# Patient Record
Sex: FEMALE | Race: WHITE | Employment: UNEMPLOYED | URBAN - METROPOLITAN AREA
[De-identification: names, ages, dates, MRNs, and addresses within clinical notes are randomized per-mention and may not be internally consistent; named-entity substitution may affect disease eponyms.]

---

## 2018-06-21 ENCOUNTER — COMMUNICATION - HEALTHEAST (OUTPATIENT)
Dept: PHYSICAL MEDICINE AND REHAB | Facility: CLINIC | Age: 48
End: 2018-06-21

## 2020-12-03 ENCOUNTER — OFFICE VISIT (OUTPATIENT)
Dept: NEUROLOGY | Facility: CLINIC | Age: 50
End: 2020-12-03
Payer: COMMERCIAL

## 2020-12-03 VITALS
WEIGHT: 178.6 LBS | DIASTOLIC BLOOD PRESSURE: 107 MMHG | BODY MASS INDEX: 30.49 KG/M2 | HEIGHT: 64 IN | SYSTOLIC BLOOD PRESSURE: 163 MMHG | HEART RATE: 78 BPM

## 2020-12-03 DIAGNOSIS — R56.9 SEIZURE-LIKE ACTIVITY (H): Primary | ICD-10-CM

## 2020-12-03 PROBLEM — M47.816 OSTEOARTHRITIS OF LUMBAR SPINE: Status: ACTIVE | Noted: 2018-06-19

## 2020-12-03 PROBLEM — G43.909 MIGRAINE HEADACHE: Status: ACTIVE | Noted: 2020-12-03

## 2020-12-03 PROBLEM — E55.9 VITAMIN D DEFICIENCY: Status: ACTIVE | Noted: 2018-11-21

## 2020-12-03 PROBLEM — G89.29 CHRONIC BACK PAIN: Status: ACTIVE | Noted: 2020-12-03

## 2020-12-03 PROBLEM — F90.9 ATTENTION DEFICIT HYPERACTIVITY DISORDER (ADHD): Status: ACTIVE | Noted: 2020-12-03

## 2020-12-03 PROBLEM — M54.9 CHRONIC BACK PAIN: Status: ACTIVE | Noted: 2020-12-03

## 2020-12-03 PROBLEM — F41.1 GENERALIZED ANXIETY DISORDER: Status: ACTIVE | Noted: 2019-03-06

## 2020-12-03 PROCEDURE — 99205 OFFICE O/P NEW HI 60 MIN: CPT | Performed by: PSYCHIATRY & NEUROLOGY

## 2020-12-03 RX ORDER — BUPROPION HYDROCHLORIDE 200 MG/1
200 TABLET, EXTENDED RELEASE ORAL
COMMUNITY
Start: 2019-02-26

## 2020-12-03 RX ORDER — LEVOTHYROXINE SODIUM 88 UG/1
TABLET ORAL
COMMUNITY
Start: 2020-11-18

## 2020-12-03 RX ORDER — TRAZODONE HYDROCHLORIDE 100 MG/1
TABLET ORAL
COMMUNITY
Start: 2020-11-12

## 2020-12-03 RX ORDER — OFLOXACIN 3 MG/ML
SOLUTION AURICULAR (OTIC)
COMMUNITY
Start: 2020-11-17

## 2020-12-03 RX ORDER — PROPRANOLOL HYDROCHLORIDE 120 MG/1
CAPSULE, EXTENDED RELEASE ORAL
COMMUNITY
Start: 2020-10-19

## 2020-12-03 RX ORDER — OLANZAPINE 20 MG/1
TABLET ORAL
COMMUNITY
Start: 2020-11-12

## 2020-12-03 RX ORDER — ALPRAZOLAM 2 MG
TABLET ORAL
COMMUNITY
Start: 2020-11-12

## 2020-12-03 RX ORDER — BUSPIRONE HYDROCHLORIDE 30 MG/1
TABLET ORAL
COMMUNITY
Start: 2020-11-18

## 2020-12-03 RX ORDER — DEXTROAMPHETAMINE SACCHARATE, AMPHETAMINE ASPARTATE, DEXTROAMPHETAMINE SULFATE AND AMPHETAMINE SULFATE 7.5; 7.5; 7.5; 7.5 MG/1; MG/1; MG/1; MG/1
TABLET ORAL
COMMUNITY
Start: 2020-11-16

## 2020-12-03 RX ORDER — GABAPENTIN 300 MG/1
300 CAPSULE ORAL 3 TIMES DAILY
COMMUNITY
Start: 2020-11-16

## 2020-12-03 RX ORDER — FLUOXETINE 40 MG/1
CAPSULE ORAL
COMMUNITY
Start: 2020-11-16

## 2020-12-03 RX ORDER — DEXTROAMPHETAMINE SACCHARATE, AMPHETAMINE ASPARTATE, DEXTROAMPHETAMINE SULFATE AND AMPHETAMINE SULFATE 3.75; 3.75; 3.75; 3.75 MG/1; MG/1; MG/1; MG/1
TABLET ORAL
COMMUNITY
Start: 2020-11-16

## 2020-12-03 SDOH — HEALTH STABILITY: MENTAL HEALTH: HOW OFTEN DO YOU HAVE A DRINK CONTAINING ALCOHOL?: NOT ASKED

## 2020-12-03 SDOH — HEALTH STABILITY: MENTAL HEALTH: HOW MANY STANDARD DRINKS CONTAINING ALCOHOL DO YOU HAVE ON A TYPICAL DAY?: NOT ASKED

## 2020-12-03 SDOH — HEALTH STABILITY: MENTAL HEALTH: HOW OFTEN DO YOU HAVE 6 OR MORE DRINKS ON ONE OCCASION?: NOT ASKED

## 2020-12-03 ASSESSMENT — MIFFLIN-ST. JEOR: SCORE: 1415.12

## 2020-12-03 NOTE — LETTER
12/3/2020         RE: Xochitl Taveras  [address] [place] [address]        Dear Colleague,    Thank you for referring your patient, Xochitl Taveras, to the [facility] [place]. Please see a copy of my visit note below.    NEUROLOGY CONSULTATION NOTE       [facility] [place]  [address] [place] [address]  Tel: [phone]  Fax: [phone]  [email or web address]     Xochitl Taveras,  1970, MRN [id number]  PCP: Ángela Staley, [phone]  Date: 12/3/2020     ASSESSMENT & PLAN     Diagnosis code: Seizure-like activity     Seizure-like activity  50-year-old female with history of depression, anxiety, ADD, substance abuse who had 2 syncopal episode and during the second 1 had generalized shaking.  Although she has history of substance abuse she denies using any drugs at that time.  Work-up so far includes normal MRI and CT scan.  I have recommended:    1.  EEG  2.  Check vitamin B1, folate and B12  3.  Follow-up the day EEG schedule    Thank you again for this referral, please feel free to contact me if you have any questions.    Kalin Saldana MD  [facility]  (Formerly, [facility]     REASON FOR CONSULTATION Syncope        HISTORY OF PRESENT ILLNESS     We have been requested by Dr. Staley to evaluate Xochitl Taveras who is a 50 year old  female for altered mental status    Patient is a 50-year-old female with history of depression, anxiety, ADD, substance abuse who was referred from emergency room with complaint of dizziness and head injury.  According to family member patient had 2 consecutive unwitnessed fall prior to her being seen in the emergency room on 2020.  She was able to recall her first fall that occurred in the basement of their home and she was able to ambulate and recall the events leading up to the fall.  She had a second fall in the  kitchen when her mother found her and that she was having generalized twitches and unable to answer question.  This lasted for few minutes and afterward patient regained consciousness.  2 days after that second fall she remained confused that got mom concerned and she was seen in the emergency room.  She had a CT of the head and MRI that was unremarkable.  Lab work included normal CBC also urinalysis was normal      PROBLEM LIST   Patient Active Problem List   Diagnosis Code     Impetigo L01.00     ADD (attention deficit disorder) F98.8     Dysthymia F34.1     Generalized anxiety disorder F41.1     Hypothyroidism E03.9     Migraine headache G43.909     Vitamin D deficiency E55.9     Chronic back pain M54.9, G89.29     Osteoarthritis of lumbar spine M47.816     Substance abuse (H) F19.10         PAST MEDICAL & SURGICAL HISTORY     Past Medical History:   Patient  has no past medical history on file.    Surgical History:  She  has a past surgical history that includes biopsy of skin lesion.     SOCIAL HISTORY     Reviewed, and she  reports that she has been smoking cigarettes. She has been smoking about 0.25 packs per day. She has never used smokeless tobacco. She reports previous alcohol use.     FAMILY HISTORY     Reviewed, and family history includes Alcoholism in her father and sister; Alzheimer Disease in her maternal grandmother; Congenital heart disease in her mother; Hypertension in her mother; Mental Illness in her maternal grandmother.     ALLERGIES     No Known Allergies      REVIEW OF SYSTEMS     A 12 point review of system was performed and was negative except as outlined in the history of present illness.     HOME MEDICATIONS       Current Outpatient Medications:      ALPRAZolam (XANAX) 2 MG tablet, , Disp: , Rfl:      amphetamine-dextroamphetamine (ADDERALL) 15 MG tablet, , Disp: , Rfl:      amphetamine-dextroamphetamine (ADDERALL) 30 MG tablet, , Disp: , Rfl:      buPROPion (WELLBUTRIN SR) 200 MG 12 hr  "tablet, Take 200 mg by mouth, Disp: , Rfl:      busPIRone HCl (BUSPAR) 30 MG tablet, , Disp: , Rfl:      cholecalciferol 50 MCG (2000 UT) CAPS, Take 2,000 Units by mouth, Disp: , Rfl:      Cyanocobalamin (VITAMIN B-12 PO), Take 1 tablet by mouth, Disp: , Rfl:      FLUoxetine (PROZAC) 40 MG capsule, , Disp: , Rfl:      gabapentin (NEURONTIN) 300 MG capsule, Take 300 mg by mouth 3 times daily, Disp: , Rfl:      levothyroxine (SYNTHROID/LEVOTHROID) 88 MCG tablet, TK 1 T PO QD, Disp: , Rfl:      ofloxacin (FLOXIN) 0.3 % otic solution, APPLY 10 DROPS TO LEFT EAR BID FOR 10 DAYS, Disp: , Rfl:      OLANZapine (ZYPREXA) 20 MG tablet, , Disp: , Rfl:      propranolol ER (INDERAL LA) 120 MG 24 hr capsule, , Disp: , Rfl:      tiZANidine (ZANAFLEX) 4 MG tablet, , Disp: , Rfl:      traZODone (DESYREL) 100 MG tablet, , Disp: , Rfl:      TURMERIC PO, , Disp: , Rfl:       PHYSICAL EXAM     Vital signs  BP (!) 163/107 (BP Location: Right arm, Patient Position: Sitting)   Pulse 78   Ht 1.626 m (5' 4\")   Wt 81 kg (178 lb 9.6 oz)   BMI 30.66 kg/m      Weight:   178 lbs 9.6 oz    GENERAL PHYSICAL EXAM: Patient is alert and oriented x 4 in no acute distress. Neck was supple, no carotid bruits, thyromegaly, lymphadenopathy or JVD noted.   NEUROLOGICAL EXAM:  Mental Status  Patient is A&O x 4. Patient recalls 3/3 objects at 5 minutes.  Speech  Speech is clear and fluent with good repetition, comprehension, and naming both for objects and parts of an object. Written and verbal comprehension is intact.  Cranial Nerves  CN II: Visual fields are full to confrontation. Fundoscopic exam is normal with sharp discs and no vascular changes. Venous pulsations are present bilaterally. Pupils are equal and reactive to light.   CN III, IV, VI: EOMI, PERRLA  CN V: Facial sensation is intact to pinprick in all 3 divisions bilaterally. Corneal responses are intact.  CN VII: Face is symmetric with normal eye closure and smile.  CN VII: Hearing is normal " to rubbing fingers  CN IX, X: Palate elevates symmetrically. Phonation is normal.  CN XI: Head turning and shoulder shrug are intact  CN XII: Tongue is midline with normal movements and no atrophy.  Motor Exam  Muscle bulk and tone are normal. No pronator drift. Strength is 5/5 bilaterally. No fasciculations noted.  Reflexes  Reflexes are 2+ and symmetric at the biceps, triceps, knees, and ankles. Plantar responses are flexor.  Sensory Exam  Light touch, pinprick, position sense, and vibration sense are intact bilaterally. No astereognosia, agraphesthesia or extinction to bilateral simultaneous stimulation.  Coordination  Rapid alternating movements and fine finger movements are intact. No dysmetria on FNF and HKS. Romberg negative.  Gait  Posture is normal.Tandem gait is normal. Able to walk on toes and heels.     DIAGNOSTIC STUDIES     PERTINENT RADIOLOGY  Following imaging studies were reviewed:     CT BRAIN 11/5/2020  1.  No acute intracranial injury, hemorrhage, mass, or CT evidence of recent ischemia.    MRI BRAIN 11/5/2020  1.  No evidence of acute intracranial hemorrhage, mass effect, or infarction.  2.  Minimal nonspecific white matter changes.     PERTINENT LABS  Following labs were reviewed:     Ref. Range 11/5/2020 14:20 11/5/2020 15:26   Potassium Latest Ref Range: 3.5 - 5.0 mmol/L  4.1   Chloride Latest Ref Range: 98 - 107 mmol/L  104   CO2 Latest Ref Range: 22 - 31 mmol/L  25   Anion Gap, Calculation Latest Ref Range: 5 - 18 mmol/L  9   BUN Latest Ref Range: 8 - 22 mg/dL  9   Creatinine Latest Ref Range: 0.60 - 1.10 mg/dL  0.71   GFR MDRD Af Amer Latest Ref Range: >60 mL/min/1.73m2  >60   GFR MDRD Non Af Amer Latest Ref Range: >60 mL/min/1.73m2  >60   Calcium Latest Ref Range: 8.5 - 10.5 mg/dL  9.1   Glucose Latest Ref Range: 70 - 125 mg/dL  88   WBC Latest Ref Range: 4.0 - 11.0 thou/uL 5.4    RBC Latest Ref Range: 3.80 - 5.40 mill/uL 3.74 (L)    Hemoglobin Latest Ref Range: 12.0 - 16.0 g/dL 11.5 (L)     Hematocrit Latest Ref Range: 35.0 - 47.0 % 34.5 (L)    MCV Latest Ref Range: 80 - 100 fL 92    MCH Latest Ref Range: 27.0 - 34.0 pg 30.7    MCHC Latest Ref Range: 32.0 - 36.0 g/dL 33.3    RDW Latest Ref Range: 11.0 - 14.5 % 13.0    Platelets Latest Ref Range: 140 - 440 thou/uL 289    MPV Latest Ref Range: 8.5 - 12.5 fL 9.3    Neutrophils % Latest Ref Range: 50 - 70 % 48 (L)    Lymphocytes % Latest Ref Range: 20 - 40 % 35    Monocytes % Latest Ref Range: 2 - 10 % 12 (H)    Eosinophils % Latest Ref Range: 0 - 6 % 5    Basophils % Latest Ref Range: 0 - 2 % 1    Neutrophils Absolute Latest Ref Range: 2.0 - 7.7 thou/uL 2.6    Lymphocytes Absolute Latest Ref Range: 0.8 - 4.4 thou/uL 1.9    Monocytes Absolute Latest Ref Range: 0.0 - 0.9 thou/uL 0.6    Eosinophils Absolute Latest Ref Range: 0.0 - 0.4 thou/uL 0.3    Basophils Absolute Latest Ref Range: 0.0 - 0.2 thou/uL 0.0               Total time spent for face to face visit, reviewing labs/imaging studies, counseling and coordination of care was: 1 Hour 15 Minutes More than 50% of this time was spent on counseling and coordination of care.      This note was dictated using voice recognition software.  Any grammatical or context distortions are unintentional and inherent to the software.       Again, thank you for allowing me to participate in the care of your patient.        Sincerely,        Kalin Saldana MD

## 2020-12-03 NOTE — NURSING NOTE
Chief Complaint   Patient presents with     Syncope     2 syncope episodes with LOC, AMS the next 3 days after the falls, head injury      Abiola Rodrigues CMA on 12/3/2020 at 10:46 AM

## 2020-12-03 NOTE — PROGRESS NOTES
NEUROLOGY CONSULTATION NOTE       [facility] NEUROLOGY [place]  [address] [place] [address]  Tel: [phone]  Fax: [phone]  [email or web address]     Xochitl Taveras,  1970, MRN [id number]  PCP: Ángela Staley, [phone]  Date: 12/3/2020     ASSESSMENT & PLAN     Diagnosis code: Seizure-like activity     Seizure-like activity  50-year-old female with history of depression, anxiety, ADD, substance abuse who had 2 syncopal episode and during the second 1 had generalized shaking.  Although she has history of substance abuse she denies using any drugs at that time.  Work-up so far includes normal MRI and CT scan.  I have recommended:    1.  EEG  2.  Check vitamin B1, folate and B12  3.  Follow-up the day EEG schedule    Thank you again for this referral, please feel free to contact me if you have any questions.    Kalin Saldana MD  [facility]  ([facility], [facility]     REASON FOR CONSULTATION Syncope        HISTORY OF PRESENT ILLNESS     We have been requested by Dr. Staley to evaluate Xochitl Taveras who is a 50 year old  female for altered mental status    Patient is a 50-year-old female with history of depression, anxiety, ADD, substance abuse who was referred from emergency room with complaint of dizziness and head injury.  According to family member patient had 2 consecutive unwitnessed fall prior to her being seen in the emergency room on 2020.  She was able to recall her first fall that occurred in the basement of their home and she was able to ambulate and recall the events leading up to the fall.  She had a second fall in the kitchen when her mother found her and that she was having generalized twitches and unable to answer question.  This lasted for few minutes and afterward patient regained consciousness.  2 days after that second fall she remained confused that got mom concerned and she was  seen in the emergency room.  She had a CT of the head and MRI that was unremarkable.  Lab work included normal CBC also urinalysis was normal      PROBLEM LIST   Patient Active Problem List   Diagnosis Code     Impetigo L01.00     ADD (attention deficit disorder) F98.8     Dysthymia F34.1     Generalized anxiety disorder F41.1     Hypothyroidism E03.9     Migraine headache G43.909     Vitamin D deficiency E55.9     Chronic back pain M54.9, G89.29     Osteoarthritis of lumbar spine M47.816     Substance abuse (H) F19.10         PAST MEDICAL & SURGICAL HISTORY     Past Medical History:   Patient  has no past medical history on file.    Surgical History:  She  has a past surgical history that includes biopsy of skin lesion.     SOCIAL HISTORY     Reviewed, and she  reports that she has been smoking cigarettes. She has been smoking about 0.25 packs per day. She has never used smokeless tobacco. She reports previous alcohol use.     FAMILY HISTORY     Reviewed, and family history includes Alcoholism in her father and sister; Alzheimer Disease in her maternal grandmother; Congenital heart disease in her mother; Hypertension in her mother; Mental Illness in her maternal grandmother.     ALLERGIES     No Known Allergies      REVIEW OF SYSTEMS     A 12 point review of system was performed and was negative except as outlined in the history of present illness.     HOME MEDICATIONS       Current Outpatient Medications:      ALPRAZolam (XANAX) 2 MG tablet, , Disp: , Rfl:      amphetamine-dextroamphetamine (ADDERALL) 15 MG tablet, , Disp: , Rfl:      amphetamine-dextroamphetamine (ADDERALL) 30 MG tablet, , Disp: , Rfl:      buPROPion (WELLBUTRIN SR) 200 MG 12 hr tablet, Take 200 mg by mouth, Disp: , Rfl:      busPIRone HCl (BUSPAR) 30 MG tablet, , Disp: , Rfl:      cholecalciferol 50 MCG (2000 UT) CAPS, Take 2,000 Units by mouth, Disp: , Rfl:      Cyanocobalamin (VITAMIN B-12 PO), Take 1 tablet by mouth, Disp: , Rfl:       "FLUoxetine (PROZAC) 40 MG capsule, , Disp: , Rfl:      gabapentin (NEURONTIN) 300 MG capsule, Take 300 mg by mouth 3 times daily, Disp: , Rfl:      levothyroxine (SYNTHROID/LEVOTHROID) 88 MCG tablet, TK 1 T PO QD, Disp: , Rfl:      ofloxacin (FLOXIN) 0.3 % otic solution, APPLY 10 DROPS TO LEFT EAR BID FOR 10 DAYS, Disp: , Rfl:      OLANZapine (ZYPREXA) 20 MG tablet, , Disp: , Rfl:      propranolol ER (INDERAL LA) 120 MG 24 hr capsule, , Disp: , Rfl:      tiZANidine (ZANAFLEX) 4 MG tablet, , Disp: , Rfl:      traZODone (DESYREL) 100 MG tablet, , Disp: , Rfl:      TURMERIC PO, , Disp: , Rfl:       PHYSICAL EXAM     Vital signs  BP (!) 163/107 (BP Location: Right arm, Patient Position: Sitting)   Pulse 78   Ht 1.626 m (5' 4\")   Wt 81 kg (178 lb 9.6 oz)   BMI 30.66 kg/m      Weight:   178 lbs 9.6 oz    GENERAL PHYSICAL EXAM: Patient is alert and oriented x 4 in no acute distress. Neck was supple, no carotid bruits, thyromegaly, lymphadenopathy or JVD noted.   NEUROLOGICAL EXAM:  Mental Status  Patient is A&O x 4. Patient recalls 3/3 objects at 5 minutes.  Speech  Speech is clear and fluent with good repetition, comprehension, and naming both for objects and parts of an object. Written and verbal comprehension is intact.  Cranial Nerves  CN II: Visual fields are full to confrontation. Fundoscopic exam is normal with sharp discs and no vascular changes. Venous pulsations are present bilaterally. Pupils are equal and reactive to light.   CN III, IV, VI: EOMI, PERRLA  CN V: Facial sensation is intact to pinprick in all 3 divisions bilaterally. Corneal responses are intact.  CN VII: Face is symmetric with normal eye closure and smile.  CN VII: Hearing is normal to rubbing fingers  CN IX, X: Palate elevates symmetrically. Phonation is normal.  CN XI: Head turning and shoulder shrug are intact  CN XII: Tongue is midline with normal movements and no atrophy.  Motor Exam  Muscle bulk and tone are normal. No pronator drift. " Strength is 5/5 bilaterally. No fasciculations noted.  Reflexes  Reflexes are 2+ and symmetric at the biceps, triceps, knees, and ankles. Plantar responses are flexor.  Sensory Exam  Light touch, pinprick, position sense, and vibration sense are intact bilaterally. No astereognosia, agraphesthesia or extinction to bilateral simultaneous stimulation.  Coordination  Rapid alternating movements and fine finger movements are intact. No dysmetria on FNF and HKS. Romberg negative.  Gait  Posture is normal.Tandem gait is normal. Able to walk on toes and heels.     DIAGNOSTIC STUDIES     PERTINENT RADIOLOGY  Following imaging studies were reviewed:     CT BRAIN 11/5/2020  1.  No acute intracranial injury, hemorrhage, mass, or CT evidence of recent ischemia.    MRI BRAIN 11/5/2020  1.  No evidence of acute intracranial hemorrhage, mass effect, or infarction.  2.  Minimal nonspecific white matter changes.     PERTINENT LABS  Following labs were reviewed:     Ref. Range 11/5/2020 14:20 11/5/2020 15:26   Potassium Latest Ref Range: 3.5 - 5.0 mmol/L  4.1   Chloride Latest Ref Range: 98 - 107 mmol/L  104   CO2 Latest Ref Range: 22 - 31 mmol/L  25   Anion Gap, Calculation Latest Ref Range: 5 - 18 mmol/L  9   BUN Latest Ref Range: 8 - 22 mg/dL  9   Creatinine Latest Ref Range: 0.60 - 1.10 mg/dL  0.71   GFR MDRD Af Amer Latest Ref Range: >60 mL/min/1.73m2  >60   GFR MDRD Non Af Amer Latest Ref Range: >60 mL/min/1.73m2  >60   Calcium Latest Ref Range: 8.5 - 10.5 mg/dL  9.1   Glucose Latest Ref Range: 70 - 125 mg/dL  88   WBC Latest Ref Range: 4.0 - 11.0 thou/uL 5.4    RBC Latest Ref Range: 3.80 - 5.40 mill/uL 3.74 (L)    Hemoglobin Latest Ref Range: 12.0 - 16.0 g/dL 11.5 (L)    Hematocrit Latest Ref Range: 35.0 - 47.0 % 34.5 (L)    MCV Latest Ref Range: 80 - 100 fL 92    MCH Latest Ref Range: 27.0 - 34.0 pg 30.7    MCHC Latest Ref Range: 32.0 - 36.0 g/dL 33.3    RDW Latest Ref Range: 11.0 - 14.5 % 13.0    Platelets Latest Ref Range:  140 - 440 thou/uL 289    MPV Latest Ref Range: 8.5 - 12.5 fL 9.3    Neutrophils % Latest Ref Range: 50 - 70 % 48 (L)    Lymphocytes % Latest Ref Range: 20 - 40 % 35    Monocytes % Latest Ref Range: 2 - 10 % 12 (H)    Eosinophils % Latest Ref Range: 0 - 6 % 5    Basophils % Latest Ref Range: 0 - 2 % 1    Neutrophils Absolute Latest Ref Range: 2.0 - 7.7 thou/uL 2.6    Lymphocytes Absolute Latest Ref Range: 0.8 - 4.4 thou/uL 1.9    Monocytes Absolute Latest Ref Range: 0.0 - 0.9 thou/uL 0.6    Eosinophils Absolute Latest Ref Range: 0.0 - 0.4 thou/uL 0.3    Basophils Absolute Latest Ref Range: 0.0 - 0.2 thou/uL 0.0               Total time spent for face to face visit, reviewing labs/imaging studies, counseling and coordination of care was: 1 Hour 15 Minutes More than 50% of this time was spent on counseling and coordination of care.      This note was dictated using voice recognition software.  Any grammatical or context distortions are unintentional and inherent to the software.

## 2021-05-29 ENCOUNTER — RECORDS - HEALTHEAST (OUTPATIENT)
Dept: ADMINISTRATIVE | Facility: CLINIC | Age: 51
End: 2021-05-29

## 2021-06-01 ENCOUNTER — RECORDS - HEALTHEAST (OUTPATIENT)
Dept: ADMINISTRATIVE | Facility: CLINIC | Age: 51
End: 2021-06-01

## 2021-06-02 ENCOUNTER — RECORDS - HEALTHEAST (OUTPATIENT)
Dept: ADMINISTRATIVE | Facility: CLINIC | Age: 51
End: 2021-06-02

## 2021-06-03 ENCOUNTER — RECORDS - HEALTHEAST (OUTPATIENT)
Dept: ADMINISTRATIVE | Facility: CLINIC | Age: 51
End: 2021-06-03

## 2023-02-21 ENCOUNTER — TELEPHONE (OUTPATIENT)
Dept: PEDIATRICS | Facility: CLINIC | Age: 53
End: 2023-02-21
Payer: COMMERCIAL

## 2023-02-21 NOTE — TELEPHONE ENCOUNTER
Reason for call:  Other   Patient called regarding (reason for call): call back  Additional comments: Per patient: Needs a call back regarding renewal of cannabis application    Phone number to reach patient:  Cell number on file:    Telephone Information:   Mobile [phone]       Best Time:  Anytime    Can we leave a detailed message on this number?  YES    Travel screening: Not Applicable

## 2023-02-22 NOTE — TELEPHONE ENCOUNTER
Called patient to discuss appointment, no answer. LVM requesting a call back directly to PAL extension.     Huddled with Ángela Brothers, will complete renewal once patient is scheduled. Whatever next available open slot she has.     Debra Walker, CHG  [phone]

## 2023-02-27 NOTE — TELEPHONE ENCOUNTER
Patient returned call, scheduled appointment in next available slot.     Debra Walker CHMANUEL  [phone]

## 2024-12-18 ENCOUNTER — HOSPITAL ENCOUNTER (EMERGENCY)
Facility: CLINIC | Age: 54
Discharge: HOME | End: 2024-12-19
Payer: COMMERCIAL

## 2024-12-18 ENCOUNTER — APPOINTMENT (OUTPATIENT)
Dept: CT IMAGING | Facility: CLINIC | Age: 54
End: 2024-12-18
Payer: COMMERCIAL

## 2024-12-18 VITALS
DIASTOLIC BLOOD PRESSURE: 78 MMHG | OXYGEN SATURATION: 97 % | SYSTOLIC BLOOD PRESSURE: 127 MMHG | BODY MASS INDEX: 32.44 KG/M2 | RESPIRATION RATE: 18 BRPM | WEIGHT: 190 LBS | TEMPERATURE: 97.7 F | HEIGHT: 64 IN | HEART RATE: 78 BPM

## 2024-12-18 DIAGNOSIS — H66.92 ACUTE LEFT OTITIS MEDIA: ICD-10-CM

## 2024-12-18 DIAGNOSIS — L03.211 CELLULITIS OF FACE: ICD-10-CM

## 2024-12-18 LAB
ANION GAP SERPL CALCULATED.3IONS-SCNC: 12 MMOL/L (ref 7–15)
BASOPHILS # BLD AUTO: 0 10E3/UL (ref 0–0.2)
BASOPHILS NFR BLD AUTO: 0 %
BUN SERPL-MCNC: 17.4 MG/DL (ref 6–20)
CALCIUM SERPL-MCNC: 9.6 MG/DL (ref 8.8–10.4)
CHLORIDE SERPL-SCNC: 99 MMOL/L (ref 98–107)
CREAT SERPL-MCNC: 0.86 MG/DL (ref 0.51–0.95)
EGFRCR SERPLBLD CKD-EPI 2021: 80 ML/MIN/1.73M2
EOSINOPHIL # BLD AUTO: 0.3 10E3/UL (ref 0–0.7)
EOSINOPHIL NFR BLD AUTO: 2 %
ERYTHROCYTE [DISTWIDTH] IN BLOOD BY AUTOMATED COUNT: 13.6 % (ref 10–15)
GLUCOSE SERPL-MCNC: 98 MG/DL (ref 70–99)
HCO3 SERPL-SCNC: 26 MMOL/L (ref 22–29)
HCT VFR BLD AUTO: 37.2 % (ref 35–47)
HGB BLD-MCNC: 12.1 G/DL (ref 11.7–15.7)
IMM GRANULOCYTES # BLD: 0.1 10E3/UL
IMM GRANULOCYTES NFR BLD: 0 %
LYMPHOCYTES # BLD AUTO: 1.8 10E3/UL (ref 0.8–5.3)
LYMPHOCYTES NFR BLD AUTO: 13 %
MCH RBC QN AUTO: 28.9 PG (ref 26.5–33)
MCHC RBC AUTO-ENTMCNC: 32.5 G/DL (ref 31.5–36.5)
MCV RBC AUTO: 89 FL (ref 78–100)
MONOCYTES # BLD AUTO: 1.1 10E3/UL (ref 0–1.3)
MONOCYTES NFR BLD AUTO: 8 %
NEUTROPHILS # BLD AUTO: 10.1 10E3/UL (ref 1.6–8.3)
NEUTROPHILS NFR BLD AUTO: 76 %
NRBC # BLD AUTO: 0 10E3/UL
NRBC BLD AUTO-RTO: 0 /100
PLATELET # BLD AUTO: 305 10E3/UL (ref 150–450)
POTASSIUM SERPL-SCNC: 4 MMOL/L (ref 3.4–5.3)
RBC # BLD AUTO: 4.19 10E6/UL (ref 3.8–5.2)
SODIUM SERPL-SCNC: 137 MMOL/L (ref 135–145)
WBC # BLD AUTO: 13.3 10E3/UL (ref 4–11)

## 2024-12-18 PROCEDURE — 80048 BASIC METABOLIC PNL TOTAL CA: CPT

## 2024-12-18 PROCEDURE — 96365 THER/PROPH/DIAG IV INF INIT: CPT | Mod: 59

## 2024-12-18 PROCEDURE — 250N000013 HC RX MED GY IP 250 OP 250 PS 637

## 2024-12-18 PROCEDURE — 99285 EMERGENCY DEPT VISIT HI MDM: CPT | Mod: 25

## 2024-12-18 PROCEDURE — 70481 CT ORBIT/EAR/FOSSA W/DYE: CPT

## 2024-12-18 PROCEDURE — 250N000011 HC RX IP 250 OP 636

## 2024-12-18 PROCEDURE — 82565 ASSAY OF CREATININE: CPT

## 2024-12-18 PROCEDURE — 36415 COLL VENOUS BLD VENIPUNCTURE: CPT

## 2024-12-18 PROCEDURE — 85025 COMPLETE CBC W/AUTO DIFF WBC: CPT

## 2024-12-18 RX ORDER — IBUPROFEN 600 MG/1
600 TABLET, FILM COATED ORAL ONCE
Status: COMPLETED | OUTPATIENT
Start: 2024-12-18 | End: 2024-12-18

## 2024-12-18 RX ORDER — CEFTRIAXONE 1 G/1
1 INJECTION, POWDER, FOR SOLUTION INTRAMUSCULAR; INTRAVENOUS ONCE
Status: COMPLETED | OUTPATIENT
Start: 2024-12-18 | End: 2024-12-19

## 2024-12-18 RX ORDER — CEFDINIR 300 MG/1
300 CAPSULE ORAL 2 TIMES DAILY
Qty: 14 CAPSULE | Refills: 0 | Status: SHIPPED | OUTPATIENT
Start: 2024-12-18 | End: 2024-12-25

## 2024-12-18 RX ORDER — IOPAMIDOL 755 MG/ML
75 INJECTION, SOLUTION INTRAVASCULAR ONCE
Status: COMPLETED | OUTPATIENT
Start: 2024-12-18 | End: 2024-12-18

## 2024-12-18 RX ADMIN — CEFTRIAXONE 1 G: 1 INJECTION, POWDER, FOR SOLUTION INTRAMUSCULAR; INTRAVENOUS at 23:48

## 2024-12-18 RX ADMIN — IOPAMIDOL 75 ML: 755 INJECTION, SOLUTION INTRAVENOUS at 21:33

## 2024-12-18 RX ADMIN — IBUPROFEN 600 MG: 600 TABLET ORAL at 21:15

## 2024-12-18 ASSESSMENT — COLUMBIA-SUICIDE SEVERITY RATING SCALE - C-SSRS
2. HAVE YOU ACTUALLY HAD ANY THOUGHTS OF KILLING YOURSELF IN THE PAST MONTH?: NO
6. HAVE YOU EVER DONE ANYTHING, STARTED TO DO ANYTHING, OR PREPARED TO DO ANYTHING TO END YOUR LIFE?: NO
1. IN THE PAST MONTH, HAVE YOU WISHED YOU WERE DEAD OR WISHED YOU COULD GO TO SLEEP AND NOT WAKE UP?: NO

## 2024-12-19 NOTE — DISCHARGE INSTRUCTIONS
The CT revealed a superficial skin infection which is causing the swelling and redness around your ear. You also have an inner ear infection which is likely what caused the skin infection. For this, please take the antibitoics for the next 7 days. You can also take tylenol and ibuprofen as needed for pain and discomfort. Please return to the ED for new or worsening symptoms.

## 2024-12-19 NOTE — ED PROVIDER NOTES
Emergency Department Encounter   NAME: Xochitl Taveras  AGE: 54 year old female   YOB: 1970 ;   MRN: [id number] ;    ED PROVIDER: Britany Adams PA-C    PCP: Ángela Staley    Evaluation Date & Time:   Patient was evaluated on 12/18/24 at 7:03 PM    CHIEF COMPLAINT:  Ear Problem      FINAL IMPRESSION:  No diagnosis found.      IMPRESSION AND PLAN   MDM: Xochitl Taveras is a 54 year old female with a pertinent history of *** who presents to the ED by *** for evaluation of ***.    Vitals ***. On exam ***.     Upon reevaluation, ***.    Patient seen in conjunction with  ***.    Medical Decision Making  Obtained supplemental history:Supplemental history obtained?: Documented in chart  Reviewed external records: External records reviewed?: Documented in chart and Outpatient Record: Patient was seen at [facility] on 10/14/24   Care impacted by chronic illness:Alcohol and/or Drug Abuse or Dependence and Other: Osteoarthritis, hypothyroidism, dysthymia, DINA, ADD, impetigo, chronic back pain and migraines.    Care significantly affected by social determinants of health:Alcohol Abuse and/or Recreational Drug Use  Did you consider but not order tests?: Work up considered but not performed and documented in chart, if applicable  Did you interpret images independently?: Independent interpretation of ECG and images noted in documentation, when applicable.  Consultation discussion with other provider:{Did you involve another provider (consultant, , pharmacy, etc.)?:[id number]}  {ADMIT VS D/C:624538}    {[facility] DOCUMENTATION:[id number]}       Chart Review:      ED COURSE:  8:39 PM I met and introduced myself to the patient. I gathered initial history and performed my physical exam. We discussed plan for initial workup.   *** I have staffed the patient with  ***, ED MD, who has evaluated the patient and agrees with all aspects of today's care.   *** I rechecked the patient and discussed  "results, discharge, follow up, and reasons to return to the ED.           MEDICATIONS GIVEN IN THE EMERGENCY DEPARTMENT:  Medications - No data to display      NEW PRESCRIPTIONS STARTED AT TODAY'S ED VISIT:  New Prescriptions    No medications on file         BRIEF HPI   Patient information was obtained from: Patient    Use of Intrepreter: N/A     Xochitl Taveras is a 54 year old female with a pertinent history of Alcohol and/or Drug Abuse or Dependence, Osteoarthritis, hypothyroidism, dysthymia, DINA, ADD, impetigo, chronic back pain and migraines who presents to the ED by car for evaluation of left ear redness and swelling.     Per chart review, patient was seen at [facility] on 10/14/24 for a follow up and hemorrhoids. Visit diagnosis states: 1. Irritant contact dermatitis due to friction or contact with body fluids, unspecified 692.89 L24.A0 zinc oxide 10 % ointment clotrimazole-betamethasone (LOTRISONE) 1-0.05 % cream 2. Mixed hyperlipidemia 272.2 E78.2 atorvastatin (LIPITOR) 80 mg tablet  LIPIDS W RFLX TO DIRECT LDL   3. Acquired hypothyroidism 244.9 E03.9 TSH W/RFLX FREE T4   THYROID PEROXIDASE ANTIBODY BASIC METABOLIC PANEL CALCIUM TOTAL losartan (COZAAR) 25 mg tablet levothyroxine 88 mcg tablet 4. Primary hypertension 401.9 I10 losartan (COZAAR) 25 mg tablet 5. Vitamin deficiency 269.2 E56.9 25 HYDROXY INCLUDES FRACTIONS IF PERFORMED CYANOCOBALAMIN VITAMIN B-12   6. Screening for diabetes mellitus V77.1 Z13.1 HEMOGLOBIN A1C (POCT)     The patient reports redness and swelling in the left ear that started this afternoon. The symptoms came on suddenly. However, patient reports for the past week, she has been feeling like something was inside her ear, and tried draining. Nothing was coming out when patient tried draining her ears.  Patient used OTC debrox drops with no relief. Patient also reports that the fevers and chills and dizziness started tonight. She describes the dizziness as a \"room " "spinning\" dizziness. It is also positional. Patient also endorses some rhinorrhea. No medications were taken for the symptoms today.     Patient denies ear drainage, nausea,vomiting, chest pain or shortness of breath.. No other complaints at this time.       REVIEW OF SYSTEMS:  Pertinent positive and negative symptoms per HPI.       MEDICAL HISTORY     No past medical history on file.    Past Surgical History:   Procedure Laterality Date    BIOPSY OF SKIN LESION         Family History   Problem Relation Age of Onset    Mental Illness Maternal Grandmother     Alzheimer Disease Maternal Grandmother     Hypertension Mother     Congenital heart disease Mother     Alcoholism Father     Alcoholism Sister     Cancer No family hx of         no skin cancer    Skin Cancer No family hx of        Social History     Tobacco Use    Smoking status: Every Day     Current packs/day: 0.25     Types: Cigarettes    Smokeless tobacco: Never   Substance Use Topics    Alcohol use: Not Currently         PHYSICAL EXAM     First Vitals:  Patient Vitals for the past 24 hrs:   BP Temp Temp src Pulse Resp SpO2 Height Weight   12/18/24 1902 (!) 149/71 97.7  F (36.5  C) Oral 81 18 98 % 1.626 m (5' 4\") 86.2 kg (190 lb)       PHYSICAL EXAM:  Physical Exam  HENT:      Right Ear: Hearing, tympanic membrane, ear canal and external ear normal.      Left Ear: Tympanic membrane is erythematous and bulging.      Ears:      Comments: No otorrhea bilaterally. Preauricular and posterior ocular is erythematous on the left ear. Mild pain with manipulation of the pinna of the left ear           RESULTS     LAB:  All pertinent labs reviewed and interpreted  Labs Ordered and Resulted from Time of ED Arrival to Time of ED Departure - No data to display      RADIOLOGY:  No orders to display         ECG:  Performed at: ***    Impression: ***    Rate: ***  Rhythm: ***  Axis: ***  AR Interval: ***  QRS Interval: ***  QTc Interval: ***  ST Changes: ***  Comparison: " ***    EKG results reviewed and interpreted by  ***, ANTOLIN OBANDO.       PROCEDURES:  ***      CRITICAL CARE TIME   Performed by: {[facility] Provider:[id number]}  Authorized by: {[facility] Provider:[id number]}  Total critical care time: *** minutes  Critical care was necessary to treat or prevent imminent or life-threatening deterioration of the following conditions: ***  Critical care was time spent personally by me on the following activities: development of treatment plan with patient or surrogate, discussions with consultants, examination of patient, evaluation of patient's response to treatment, obtaining history from patient or surrogate, ordering and performing treatments and interventions, ordering and review of laboratory studies, ordering and review of radiographic studies, re-evaluation of patient's condition and monitoring for potential decompensation.  Critical care time was exclusive of separately billable procedures and treating other patients.       I, Susy Jain, am serving as a scribe to document services personally performed by Britany Adams PA-C, based on my observation and the provider's statements to me. I, Britany Adams PA-C attest that uSsy Jain is acting in a scribe capacity, has observed my performance of the services and has documented them in accordance with my direction.       Britany Adams PA-C  Emergency Medicine   [facility] EMERGENCY ROOM

## 2024-12-19 NOTE — ED TRIAGE NOTES
"Arrives to ED with c/o L ear redness and swelling that began today. Noted \"trying to get wax out\" over past week. \"It felt like something was in there\". Denies drainage from ear.      Triage Assessment (Adult)       Row Name 12/18/24 2332          Triage Assessment    Airway WDL WDL        Respiratory WDL    Respiratory WDL WDL        Skin Circulation/Temperature WDL    Skin Circulation/Temperature WDL WDL        Cardiac WDL    Cardiac WDL WDL        Peripheral/Neurovascular WDL    Peripheral Neurovascular WDL WDL        Cognitive/Neuro/Behavioral WDL    Cognitive/Neuro/Behavioral WDL WDL                     "

## 2024-12-23 NOTE — TELEPHONE ENCOUNTER
"FUTURE VISIT INFORMATION      FUTURE VISIT INFORMATION:  Date: 4/9/25  Time: 9:40AM  Location: [facility]  REFERRAL INFORMATION:  Referring provider:   Britany Adams PA-C  Referring providers clinic:  [facility]   Reason for visit/diagnosis  Acute left otitis media [H66.92]  Cellulitis of face [L03.211]   cellulitis of preauricular area, want to ensure doesn't result in mastoiditis. Ref by Britany Adams PA-C. [facility] verified     RECORDS REQUESTED FROM:       Clinic name Comments Records Status Imaging Status   [facility]  12/18/24- ED W.  Britany Adams PA-C  11/5/20- ED  EPIC     IMAGING  12/18/24- CT TEMPORAL BONE   11/5/20- MR BRAIN  [facility]  PACS            \"Please notify/message CSS if patient completed outside imaging prior to scheduled appointment and/or any outside records that might have been missed at pre visit -Thanks\"  "

## 2025-04-09 ENCOUNTER — PRE VISIT (OUTPATIENT)
Dept: OTOLARYNGOLOGY | Facility: CLINIC | Age: 55
End: 2025-04-09